# Patient Record
Sex: MALE | Race: ASIAN | NOT HISPANIC OR LATINO | URBAN - METROPOLITAN AREA
[De-identification: names, ages, dates, MRNs, and addresses within clinical notes are randomized per-mention and may not be internally consistent; named-entity substitution may affect disease eponyms.]

---

## 2020-07-09 ENCOUNTER — INPATIENT (INPATIENT)
Facility: HOSPITAL | Age: 80
LOS: 4 days | Discharge: HOME | DRG: 378 | End: 2020-07-14
Attending: FAMILY MEDICINE | Admitting: INTERNAL MEDICINE
Payer: MEDICARE

## 2020-07-09 VITALS
OXYGEN SATURATION: 100 % | RESPIRATION RATE: 18 BRPM | DIASTOLIC BLOOD PRESSURE: 63 MMHG | TEMPERATURE: 99 F | SYSTOLIC BLOOD PRESSURE: 112 MMHG | HEART RATE: 109 BPM

## 2020-07-09 DIAGNOSIS — D12.3 BENIGN NEOPLASM OF TRANSVERSE COLON: ICD-10-CM

## 2020-07-09 DIAGNOSIS — K57.30 DIVERTICULOSIS OF LARGE INTESTINE WITHOUT PERFORATION OR ABSCESS WITHOUT BLEEDING: ICD-10-CM

## 2020-07-09 DIAGNOSIS — D62 ACUTE POSTHEMORRHAGIC ANEMIA: ICD-10-CM

## 2020-07-09 DIAGNOSIS — K92.2 GASTROINTESTINAL HEMORRHAGE, UNSPECIFIED: ICD-10-CM

## 2020-07-09 DIAGNOSIS — Z86.010 PERSONAL HISTORY OF COLONIC POLYPS: ICD-10-CM

## 2020-07-09 DIAGNOSIS — Z11.59 ENCOUNTER FOR SCREENING FOR OTHER VIRAL DISEASES: ICD-10-CM

## 2020-07-09 DIAGNOSIS — Z98.890 OTHER SPECIFIED POSTPROCEDURAL STATES: Chronic | ICD-10-CM

## 2020-07-09 DIAGNOSIS — K76.0 FATTY (CHANGE OF) LIVER, NOT ELSEWHERE CLASSIFIED: ICD-10-CM

## 2020-07-09 DIAGNOSIS — K31.819 ANGIODYSPLASIA OF STOMACH AND DUODENUM WITHOUT BLEEDING: ICD-10-CM

## 2020-07-09 DIAGNOSIS — D12.5 BENIGN NEOPLASM OF SIGMOID COLON: ICD-10-CM

## 2020-07-09 DIAGNOSIS — E11.9 TYPE 2 DIABETES MELLITUS WITHOUT COMPLICATIONS: ICD-10-CM

## 2020-07-09 DIAGNOSIS — Z79.84 LONG TERM (CURRENT) USE OF ORAL HYPOGLYCEMIC DRUGS: ICD-10-CM

## 2020-07-09 DIAGNOSIS — E78.5 HYPERLIPIDEMIA, UNSPECIFIED: ICD-10-CM

## 2020-07-09 DIAGNOSIS — K29.70 GASTRITIS, UNSPECIFIED, WITHOUT BLEEDING: ICD-10-CM

## 2020-07-09 DIAGNOSIS — K44.9 DIAPHRAGMATIC HERNIA WITHOUT OBSTRUCTION OR GANGRENE: ICD-10-CM

## 2020-07-09 DIAGNOSIS — K25.4 CHRONIC OR UNSPECIFIED GASTRIC ULCER WITH HEMORRHAGE: ICD-10-CM

## 2020-07-09 DIAGNOSIS — K64.8 OTHER HEMORRHOIDS: ICD-10-CM

## 2020-07-09 LAB
ADD ON TEST-SPECIMEN IN LAB: SIGNIFICANT CHANGE UP
ALBUMIN SERPL ELPH-MCNC: 3.1 G/DL — LOW (ref 3.3–5)
ALP SERPL-CCNC: 37 U/L — LOW (ref 40–120)
ALT FLD-CCNC: 20 U/L — SIGNIFICANT CHANGE UP (ref 12–78)
ANION GAP SERPL CALC-SCNC: 6 MMOL/L — SIGNIFICANT CHANGE UP (ref 5–17)
APPEARANCE UR: CLEAR — SIGNIFICANT CHANGE UP
AST SERPL-CCNC: 8 U/L — LOW (ref 15–37)
BASOPHILS # BLD AUTO: 0.01 K/UL — SIGNIFICANT CHANGE UP (ref 0–0.2)
BASOPHILS NFR BLD AUTO: 0.1 % — SIGNIFICANT CHANGE UP (ref 0–2)
BILIRUB SERPL-MCNC: 0.2 MG/DL — SIGNIFICANT CHANGE UP (ref 0.2–1.2)
BILIRUB UR-MCNC: NEGATIVE — SIGNIFICANT CHANGE UP
BUN SERPL-MCNC: 45 MG/DL — HIGH (ref 7–23)
CALCIUM SERPL-MCNC: 8.3 MG/DL — LOW (ref 8.5–10.1)
CHLORIDE SERPL-SCNC: 108 MMOL/L — SIGNIFICANT CHANGE UP (ref 96–108)
CO2 SERPL-SCNC: 25 MMOL/L — SIGNIFICANT CHANGE UP (ref 22–31)
COLOR SPEC: YELLOW — SIGNIFICANT CHANGE UP
CREAT SERPL-MCNC: 0.8 MG/DL — SIGNIFICANT CHANGE UP (ref 0.5–1.3)
DIFF PNL FLD: ABNORMAL
EOSINOPHIL # BLD AUTO: 0.02 K/UL — SIGNIFICANT CHANGE UP (ref 0–0.5)
EOSINOPHIL NFR BLD AUTO: 0.2 % — SIGNIFICANT CHANGE UP (ref 0–6)
GLUCOSE SERPL-MCNC: 166 MG/DL — HIGH (ref 70–99)
GLUCOSE UR QL: 50 MG/DL
HCT VFR BLD CALC: 17.6 % — CRITICAL LOW (ref 39–50)
HGB BLD-MCNC: 6.1 G/DL — CRITICAL LOW (ref 13–17)
IMM GRANULOCYTES NFR BLD AUTO: 0.5 % — SIGNIFICANT CHANGE UP (ref 0–1.5)
KETONES UR-MCNC: ABNORMAL
LACTATE SERPL-SCNC: 1.9 MMOL/L — SIGNIFICANT CHANGE UP (ref 0.7–2)
LEUKOCYTE ESTERASE UR-ACNC: NEGATIVE — SIGNIFICANT CHANGE UP
LIDOCAIN IGE QN: 279 U/L — SIGNIFICANT CHANGE UP (ref 73–393)
LYMPHOCYTES # BLD AUTO: 2.09 K/UL — SIGNIFICANT CHANGE UP (ref 1–3.3)
LYMPHOCYTES # BLD AUTO: 24.1 % — SIGNIFICANT CHANGE UP (ref 13–44)
MAGNESIUM SERPL-MCNC: 1.9 MG/DL — SIGNIFICANT CHANGE UP (ref 1.6–2.6)
MCHC RBC-ENTMCNC: 33.9 PG — SIGNIFICANT CHANGE UP (ref 27–34)
MCHC RBC-ENTMCNC: 34.7 GM/DL — SIGNIFICANT CHANGE UP (ref 32–36)
MCV RBC AUTO: 97.8 FL — SIGNIFICANT CHANGE UP (ref 80–100)
MONOCYTES # BLD AUTO: 0.74 K/UL — SIGNIFICANT CHANGE UP (ref 0–0.9)
MONOCYTES NFR BLD AUTO: 8.5 % — SIGNIFICANT CHANGE UP (ref 2–14)
NEUTROPHILS # BLD AUTO: 5.76 K/UL — SIGNIFICANT CHANGE UP (ref 1.8–7.4)
NEUTROPHILS NFR BLD AUTO: 66.6 % — SIGNIFICANT CHANGE UP (ref 43–77)
NITRITE UR-MCNC: NEGATIVE — SIGNIFICANT CHANGE UP
PH UR: 5 — SIGNIFICANT CHANGE UP (ref 5–8)
PLATELET # BLD AUTO: 203 K/UL — SIGNIFICANT CHANGE UP (ref 150–400)
POTASSIUM SERPL-MCNC: 3.9 MMOL/L — SIGNIFICANT CHANGE UP (ref 3.5–5.3)
POTASSIUM SERPL-SCNC: 3.9 MMOL/L — SIGNIFICANT CHANGE UP (ref 3.5–5.3)
PROT SERPL-MCNC: 6.2 GM/DL — SIGNIFICANT CHANGE UP (ref 6–8.3)
PROT UR-MCNC: NEGATIVE MG/DL — SIGNIFICANT CHANGE UP
RBC # BLD: 1.8 M/UL — LOW (ref 4.2–5.8)
RBC # FLD: 13.2 % — SIGNIFICANT CHANGE UP (ref 10.3–14.5)
SARS-COV-2 RNA SPEC QL NAA+PROBE: SIGNIFICANT CHANGE UP
SODIUM SERPL-SCNC: 139 MMOL/L — SIGNIFICANT CHANGE UP (ref 135–145)
SP GR SPEC: 1 — LOW (ref 1.01–1.02)
TROPONIN I SERPL-MCNC: 0.02 NG/ML — SIGNIFICANT CHANGE UP (ref 0.01–0.04)
TROPONIN I SERPL-MCNC: <0.015 NG/ML — SIGNIFICANT CHANGE UP (ref 0.01–0.04)
TROPONIN I SERPL-MCNC: <0.015 NG/ML — SIGNIFICANT CHANGE UP (ref 0.01–0.04)
TSH SERPL-MCNC: 0.44 UU/ML — SIGNIFICANT CHANGE UP (ref 0.34–4.82)
UROBILINOGEN FLD QL: NEGATIVE MG/DL — SIGNIFICANT CHANGE UP
WBC # BLD: 8.66 K/UL — SIGNIFICANT CHANGE UP (ref 3.8–10.5)
WBC # FLD AUTO: 8.66 K/UL — SIGNIFICANT CHANGE UP (ref 3.8–10.5)

## 2020-07-09 PROCEDURE — 82962 GLUCOSE BLOOD TEST: CPT

## 2020-07-09 PROCEDURE — 80048 BASIC METABOLIC PNL TOTAL CA: CPT

## 2020-07-09 PROCEDURE — 84484 ASSAY OF TROPONIN QUANT: CPT

## 2020-07-09 PROCEDURE — C9113: CPT

## 2020-07-09 PROCEDURE — P9016: CPT

## 2020-07-09 PROCEDURE — 74177 CT ABD & PELVIS W/CONTRAST: CPT | Mod: 26

## 2020-07-09 PROCEDURE — 85014 HEMATOCRIT: CPT

## 2020-07-09 PROCEDURE — 36415 COLL VENOUS BLD VENIPUNCTURE: CPT

## 2020-07-09 PROCEDURE — 86920 COMPATIBILITY TEST SPIN: CPT

## 2020-07-09 PROCEDURE — 88305 TISSUE EXAM BY PATHOLOGIST: CPT

## 2020-07-09 PROCEDURE — 85027 COMPLETE CBC AUTOMATED: CPT

## 2020-07-09 PROCEDURE — 71045 X-RAY EXAM CHEST 1 VIEW: CPT | Mod: 26

## 2020-07-09 PROCEDURE — 83735 ASSAY OF MAGNESIUM: CPT

## 2020-07-09 PROCEDURE — 84100 ASSAY OF PHOSPHORUS: CPT

## 2020-07-09 PROCEDURE — 88312 SPECIAL STAINS GROUP 1: CPT

## 2020-07-09 PROCEDURE — 83036 HEMOGLOBIN GLYCOSYLATED A1C: CPT

## 2020-07-09 PROCEDURE — 99223 1ST HOSP IP/OBS HIGH 75: CPT | Mod: AI

## 2020-07-09 PROCEDURE — U0003: CPT

## 2020-07-09 PROCEDURE — 93010 ELECTROCARDIOGRAM REPORT: CPT

## 2020-07-09 PROCEDURE — 81001 URINALYSIS AUTO W/SCOPE: CPT

## 2020-07-09 PROCEDURE — 70450 CT HEAD/BRAIN W/O DYE: CPT | Mod: 26

## 2020-07-09 PROCEDURE — 85018 HEMOGLOBIN: CPT

## 2020-07-09 PROCEDURE — 87086 URINE CULTURE/COLONY COUNT: CPT

## 2020-07-09 PROCEDURE — 36430 TRANSFUSION BLD/BLD COMPNT: CPT

## 2020-07-09 PROCEDURE — C1889: CPT

## 2020-07-09 RX ORDER — PANTOPRAZOLE SODIUM 20 MG/1
8 TABLET, DELAYED RELEASE ORAL
Qty: 80 | Refills: 0 | Status: DISCONTINUED | OUTPATIENT
Start: 2020-07-09 | End: 2020-07-12

## 2020-07-09 RX ORDER — PANTOPRAZOLE SODIUM 20 MG/1
80 TABLET, DELAYED RELEASE ORAL ONCE
Refills: 0 | Status: COMPLETED | OUTPATIENT
Start: 2020-07-09 | End: 2020-07-09

## 2020-07-09 RX ORDER — ONDANSETRON 8 MG/1
4 TABLET, FILM COATED ORAL EVERY 6 HOURS
Refills: 0 | Status: DISCONTINUED | OUTPATIENT
Start: 2020-07-09 | End: 2020-07-14

## 2020-07-09 RX ORDER — DEXTROSE 50 % IN WATER 50 %
25 SYRINGE (ML) INTRAVENOUS ONCE
Refills: 0 | Status: DISCONTINUED | OUTPATIENT
Start: 2020-07-09 | End: 2020-07-14

## 2020-07-09 RX ORDER — ONDANSETRON 8 MG/1
4 TABLET, FILM COATED ORAL ONCE
Refills: 0 | Status: COMPLETED | OUTPATIENT
Start: 2020-07-09 | End: 2020-07-09

## 2020-07-09 RX ORDER — ATORVASTATIN CALCIUM 80 MG/1
20 TABLET, FILM COATED ORAL AT BEDTIME
Refills: 0 | Status: DISCONTINUED | OUTPATIENT
Start: 2020-07-09 | End: 2020-07-14

## 2020-07-09 RX ORDER — GLUCAGON INJECTION, SOLUTION 0.5 MG/.1ML
1 INJECTION, SOLUTION SUBCUTANEOUS ONCE
Refills: 0 | Status: DISCONTINUED | OUTPATIENT
Start: 2020-07-09 | End: 2020-07-14

## 2020-07-09 RX ORDER — DEXTROSE 50 % IN WATER 50 %
15 SYRINGE (ML) INTRAVENOUS ONCE
Refills: 0 | Status: DISCONTINUED | OUTPATIENT
Start: 2020-07-09 | End: 2020-07-14

## 2020-07-09 RX ORDER — MECLIZINE HCL 12.5 MG
25 TABLET ORAL ONCE
Refills: 0 | Status: COMPLETED | OUTPATIENT
Start: 2020-07-09 | End: 2020-07-09

## 2020-07-09 RX ORDER — SODIUM CHLORIDE 9 MG/ML
1000 INJECTION INTRAMUSCULAR; INTRAVENOUS; SUBCUTANEOUS ONCE
Refills: 0 | Status: COMPLETED | OUTPATIENT
Start: 2020-07-09 | End: 2020-07-09

## 2020-07-09 RX ORDER — DEXTROSE 50 % IN WATER 50 %
12.5 SYRINGE (ML) INTRAVENOUS ONCE
Refills: 0 | Status: DISCONTINUED | OUTPATIENT
Start: 2020-07-09 | End: 2020-07-14

## 2020-07-09 RX ORDER — INSULIN LISPRO 100/ML
VIAL (ML) SUBCUTANEOUS EVERY 6 HOURS
Refills: 0 | Status: DISCONTINUED | OUTPATIENT
Start: 2020-07-09 | End: 2020-07-11

## 2020-07-09 RX ORDER — ACETAMINOPHEN 500 MG
650 TABLET ORAL EVERY 4 HOURS
Refills: 0 | Status: DISCONTINUED | OUTPATIENT
Start: 2020-07-09 | End: 2020-07-14

## 2020-07-09 RX ORDER — SODIUM CHLORIDE 9 MG/ML
1000 INJECTION, SOLUTION INTRAVENOUS
Refills: 0 | Status: DISCONTINUED | OUTPATIENT
Start: 2020-07-09 | End: 2020-07-14

## 2020-07-09 RX ORDER — METFORMIN HYDROCHLORIDE 850 MG/1
1 TABLET ORAL
Qty: 0 | Refills: 0 | DISCHARGE

## 2020-07-09 RX ORDER — ROSUVASTATIN CALCIUM 5 MG/1
1 TABLET ORAL
Qty: 0 | Refills: 0 | DISCHARGE

## 2020-07-09 RX ADMIN — SODIUM CHLORIDE 1000 MILLILITER(S): 9 INJECTION INTRAMUSCULAR; INTRAVENOUS; SUBCUTANEOUS at 11:07

## 2020-07-09 RX ADMIN — ONDANSETRON 4 MILLIGRAM(S): 8 TABLET, FILM COATED ORAL at 11:08

## 2020-07-09 RX ADMIN — PANTOPRAZOLE SODIUM 10 MG/HR: 20 TABLET, DELAYED RELEASE ORAL at 21:35

## 2020-07-09 RX ADMIN — SODIUM CHLORIDE 1000 MILLILITER(S): 9 INJECTION INTRAMUSCULAR; INTRAVENOUS; SUBCUTANEOUS at 13:08

## 2020-07-09 RX ADMIN — ATORVASTATIN CALCIUM 20 MILLIGRAM(S): 80 TABLET, FILM COATED ORAL at 22:14

## 2020-07-09 RX ADMIN — PANTOPRAZOLE SODIUM 10 MG/HR: 20 TABLET, DELAYED RELEASE ORAL at 13:13

## 2020-07-09 RX ADMIN — Medication 25 MILLIGRAM(S): at 12:29

## 2020-07-09 RX ADMIN — PANTOPRAZOLE SODIUM 80 MILLIGRAM(S): 20 TABLET, DELAYED RELEASE ORAL at 12:29

## 2020-07-09 NOTE — H&P ADULT - HISTORY OF PRESENT ILLNESS
78yo/M with PMH diabetes, hyperlipidemia presented with weakness and melena. Patient states he had one episode of melena on July 4th, then he felt better until earlier today when he had another episode of melena. +abd discomfort, +vomiting. Denies taking NSAID's. Last EGD/colonoscopy was about 10-12 years ago with DR Bob. Not on any AC. IN ED started on Protonix drip and received already 1 Unit PRBC. Due for 2nd unit. Feels comfortable now

## 2020-07-09 NOTE — ED PROVIDER NOTE - CLINICAL SUMMARY MEDICAL DECISION MAKING FREE TEXT BOX
80 y/o male presents with vomiting, dizziness. EKG, CXR, CT head, abd, labs, urine. 80 y/o male presents with vomiting, dizziness. EKG, CXR, CT head, abd/pelvis, labs, urine.

## 2020-07-09 NOTE — H&P ADULT - ASSESSMENT
#Likely UGI bleed  #Anemia acute blood loss  Admit to med-surg  S/p 2 Units PRBc transfusion  Started on Protonix drip in ED  GI eval Dr Bob  D/w GI- likely for EGD in AM  Keep NPO for now  Monitor HH post-transfusion    #Diabetes  ISS q6h    #COVID swabbed, pending. Low suspicion    #DVT proph- venodynes    #Dispo- inpatient admit

## 2020-07-09 NOTE — ED ADULT TRIAGE NOTE - CHIEF COMPLAINT QUOTE
pt c/o vomiting since saturday with dizziness and weakness. pt dneies pain diarrhea and fever Daughter Kerline

## 2020-07-09 NOTE — H&P ADULT - NSHPLABSRESULTS_GEN_ALL_CORE
6.1    8.66  )-----------( 203      ( 2020 11:02 )             17.6     2020 11:02    139    |  108    |  45     ----------------------------<  166    3.9     |  25     |  0.80     Ca    8.3        2020 11:02  Mg     1.9       2020 11:02    TPro  6.2    /  Alb  3.1    /  TBili  0.2    /  DBili  x      /  AST  8      /  ALT  20     /  AlkPhos  37     2020 11:02    LIVER FUNCTIONS - ( 2020 11:02 )  Alb: 3.1 g/dL / Pro: 6.2 gm/dL / ALK PHOS: 37 U/L / ALT: 20 U/L / AST: 8 U/L / GGT: x           PT/INR - ( 2020 11:02 )   PT: 12.6 sec;   INR: 1.08 ratio      PTT - ( 2020 11:02 )  PTT:27.2 sec    POCT Blood Glucose.: 140 mg/dL (2020 17:37)    CARDIAC MARKERS ( 2020 14:38 )  <0.015 ng/mL / x     / x     / x     / x      CARDIAC MARKERS ( 2020 11:02 )  <0.015 ng/mL / x     / x     / x     / x        Urinalysis Basic - ( 2020 14:30 )  Color: Yellow / Appearance: Clear / S.005 / pH: x  Gluc: x / Ketone: Moderate  / Bili: Negative / Urobili: Negative mg/dL   Blood: x / Protein: Negative mg/dL / Nitrite: Negative   Leuk Esterase: Negative / RBC: 0-2 /HPF / WBC Negative   Sq Epi: x / Non Sq Epi: Occasional / Bacteria: x

## 2020-07-09 NOTE — ED PROVIDER NOTE - OBJECTIVE STATEMENT
78 y/o male with PMHx of DM, HLD presents to the ED c/o vomiting onset 5 days ago. Following morning, x3 episodes, dizziness upon sitting up. Yesterday x1 episode of emesis after eating. Describes pain as pressure on abd. +bowel movement this morning. Denies fever, dysuria, chest pain. Endorses feeling dizzy and double vision upon movement. Currently taking DM medication. No other complaints at this time.

## 2020-07-09 NOTE — ED PROVIDER NOTE - PROGRESS NOTE DETAILS
Kanu ANN for ED attending, Dr. Deng: Pt now endorsing that he has had black stool for multiple days.  Rectal exam performed. Guiac +, black stool. LOT: 175. Expiration: 2/28/2021 Kanu ANN for ED attending, Dr. Deng: Pt now endorsing that he has had black stool for multiple days.  Rectal exam performed. Guaiac +, black stool. LOT: 175. Expiration: 2/28/2021 Sowmya CAMPOS: patient endorsed to dr. means for admission for upper gib

## 2020-07-10 LAB
A1C WITH ESTIMATED AVERAGE GLUCOSE RESULT: 6 % — HIGH (ref 4–5.6)
ANION GAP SERPL CALC-SCNC: 7 MMOL/L — SIGNIFICANT CHANGE UP (ref 5–17)
BUN SERPL-MCNC: 24 MG/DL — HIGH (ref 7–23)
CALCIUM SERPL-MCNC: 7.8 MG/DL — LOW (ref 8.5–10.1)
CHLORIDE SERPL-SCNC: 111 MMOL/L — HIGH (ref 96–108)
CO2 SERPL-SCNC: 24 MMOL/L — SIGNIFICANT CHANGE UP (ref 22–31)
CREAT SERPL-MCNC: 0.61 MG/DL — SIGNIFICANT CHANGE UP (ref 0.5–1.3)
CULTURE RESULTS: SIGNIFICANT CHANGE UP
ESTIMATED AVERAGE GLUCOSE: 126 MG/DL — HIGH (ref 68–114)
GLUCOSE SERPL-MCNC: 78 MG/DL — SIGNIFICANT CHANGE UP (ref 70–99)
HCT VFR BLD CALC: 23 % — LOW (ref 39–50)
HGB BLD-MCNC: 7.7 G/DL — LOW (ref 13–17)
MCHC RBC-ENTMCNC: 31.7 PG — SIGNIFICANT CHANGE UP (ref 27–34)
MCHC RBC-ENTMCNC: 33.5 GM/DL — SIGNIFICANT CHANGE UP (ref 32–36)
MCV RBC AUTO: 94.7 FL — SIGNIFICANT CHANGE UP (ref 80–100)
PLATELET # BLD AUTO: 141 K/UL — LOW (ref 150–400)
POTASSIUM SERPL-MCNC: 4 MMOL/L — SIGNIFICANT CHANGE UP (ref 3.5–5.3)
POTASSIUM SERPL-SCNC: 4 MMOL/L — SIGNIFICANT CHANGE UP (ref 3.5–5.3)
RBC # BLD: 2.43 M/UL — LOW (ref 4.2–5.8)
RBC # FLD: 14.7 % — HIGH (ref 10.3–14.5)
SARS-COV-2 IGG SERPL QL IA: NEGATIVE — SIGNIFICANT CHANGE UP
SARS-COV-2 IGM SERPL IA-ACNC: <0.1 INDEX — SIGNIFICANT CHANGE UP
SODIUM SERPL-SCNC: 142 MMOL/L — SIGNIFICANT CHANGE UP (ref 135–145)
SPECIMEN SOURCE: SIGNIFICANT CHANGE UP
WBC # BLD: 5.18 K/UL — SIGNIFICANT CHANGE UP (ref 3.8–10.5)
WBC # FLD AUTO: 5.18 K/UL — SIGNIFICANT CHANGE UP (ref 3.8–10.5)

## 2020-07-10 PROCEDURE — 99233 SBSQ HOSP IP/OBS HIGH 50: CPT

## 2020-07-10 PROCEDURE — 88312 SPECIAL STAINS GROUP 1: CPT | Mod: 26

## 2020-07-10 PROCEDURE — 88305 TISSUE EXAM BY PATHOLOGIST: CPT | Mod: 26

## 2020-07-10 RX ORDER — SODIUM CHLORIDE 9 MG/ML
1000 INJECTION INTRAMUSCULAR; INTRAVENOUS; SUBCUTANEOUS
Refills: 0 | Status: DISCONTINUED | OUTPATIENT
Start: 2020-07-10 | End: 2020-07-11

## 2020-07-10 RX ADMIN — PANTOPRAZOLE SODIUM 10 MG/HR: 20 TABLET, DELAYED RELEASE ORAL at 08:20

## 2020-07-10 RX ADMIN — SODIUM CHLORIDE 100 MILLILITER(S): 9 INJECTION INTRAMUSCULAR; INTRAVENOUS; SUBCUTANEOUS at 01:00

## 2020-07-10 RX ADMIN — ATORVASTATIN CALCIUM 20 MILLIGRAM(S): 80 TABLET, FILM COATED ORAL at 21:22

## 2020-07-10 NOTE — CONSULT NOTE ADULT - SUBJECTIVE AND OBJECTIVE BOX
HPI:  80yo/M with PMH diabetes, hyperlipidemia presented with weakness and melena.  Noted vomiting dark blood and nausea intermittently since July 3, 2020  and then followed by melanotic stools in past several days   denies abd pain   reduced appetite  no fever or chills  no wt loss  bm's regular   last colonoscopy according to pt was 12 yrs ago.  no diarrhea  no jaundice  recent aspirin use for headache/cold   received three units of blood     PAST MEDICAL & SURGICAL HISTORY:  DM (diabetes mellitus)  S/P colonoscopic polypectomy      Allergies    No Known Allergies    Intolerances        MEDICATIONS  (STANDING):  atorvastatin 20 milliGRAM(s) Oral at bedtime  dextrose 5%. 1000 milliLiter(s) (50 mL/Hr) IV Continuous <Continuous>  dextrose 50% Injectable 12.5 Gram(s) IV Push once  dextrose 50% Injectable 25 Gram(s) IV Push once  dextrose 50% Injectable 25 Gram(s) IV Push once  insulin lispro (HumaLOG) corrective regimen sliding scale   SubCutaneous every 6 hours  multivitamin 1 Tablet(s) Oral daily  pantoprazole Infusion 8 mG/Hr (10 mL/Hr) IV Continuous <Continuous>  sodium chloride 0.9%. 1000 milliLiter(s) (100 mL/Hr) IV Continuous <Continuous>    MEDICATIONS  (PRN):  acetaminophen   Tablet .. 650 milliGRAM(s) Oral every 4 hours PRN Mild Pain (1 - 3)  aluminum hydroxide/magnesium hydroxide/simethicone Suspension 30 milliLiter(s) Oral every 4 hours PRN Dyspepsia  dextrose 40% Gel 15 Gram(s) Oral once PRN Blood Glucose LESS THAN 70 milliGRAM(s)/deciliter  glucagon  Injectable 1 milliGRAM(s) IntraMuscular once PRN Glucose LESS THAN 70 milligrams/deciliter  ondansetron Injectable 4 milliGRAM(s) IV Push every 6 hours PRN Nausea      FAMILY HISTORY:  FH: diabetes mellitus      Social History:  no tobacco and no etoh   REVIEW OF SYSTEMS      General:	No fever or chills    Skin/Breast: No jaundice or rash   		  ENMT: denies sore throat or thrush    Respiratory and Thorax: Denies dyspnea or cough or shortness of breath  	  Cardiovascular: Denies chest pain or palpitations 	    Gastrointestinal: Denies jaundice or pruritis    Genitourinary: Denies dysuria or hematuria	    Musculoskeletal: Denies muscular pain or swelling	    Neurological: Denies confusion or tremor	    Hematology/Lymphatics: Denies easy bruising 	    Endocrine:	Denies polyphagia or polyuria    See above hx otherwise neg for any major organ systems    PHYSICAL EXAM:    Vital Signs Last 24 Hrs  T(C): 36.3 (10 Jul 2020 13:53), Max: 37 (09 Jul 2020 17:50)  T(F): 97.3 (10 Jul 2020 13:53), Max: 98.6 (09 Jul 2020 17:50)  HR: 83 (10 Jul 2020 13:53) (74 - 88)  BP: 124/64 (10 Jul 2020 13:53) (102/49 - 137/67)  BP(mean): 67 (09 Jul 2020 17:50) (61 - 67)  RR: 18 (10 Jul 2020 13:53) (16 - 20)  SpO2: 100% (10 Jul 2020 13:53) (97% - 100%)    Constitutional: no acute distress    ENMT: NC/AT scl anicteric opm pink no lesions     Neck: supple. No jvd or LN    Respiratory: Clear     Cardiovascular: RRR s1s2     Gastrointestinal: Pos bs , soft , not tender, no hepatosplenomegaly,  no mass    Back: No CVA tenderness    Extremities: NO cce     Neurological: Alert and oriented x 3     Skin: No rash or jaundice     Date/Time:07-10 @ 10:17    Albumin: --  ALT/SGPT: --  Alk Phos: --  AST/SGOT: --  Bilirubin Direct: --  Bilirubin Total: --  Ca: 7.8  eGFR : 109  eGFR Non-: 94  Lipase: --  Amylase: --  INR: --  PTT: --      07-10    142  |  111<H>  |  24<H>  ----------------------------<  78  4.0   |  24  |  0.61    Ca    7.8<L>      10 Jul 2020 10:17  Mg     1.9     07-09    TPro  6.2  /  Alb  3.1<L>  /  TBili  0.2  /  DBili  x   /  AST  8<L>  /  ALT  20  /  AlkPhos  37<L>  07-09                            7.7    5.18  )-----------( 141      ( 10 Jul 2020 10:17 )             23.0

## 2020-07-10 NOTE — CONSULT NOTE ADULT - ASSESSMENT
hematemesis    melena  ugi bleed    advised upper endoscopy with possible biopsies with possible bicap cautery with iv sedation  rationale, all potential risks including but not limited to perforation requiring surgery, aspiration pneumonia or bleeding requiring blood transfusions were discussed along with all benefits and alternatives and the pt wishes to have an upper endoscopy with iv sedation.  protonix drip  maintain hgb above 8  dnr rescinded for procedure time/ pt consented to this.   npo except po meds after mn

## 2020-07-11 LAB
ANION GAP SERPL CALC-SCNC: 9 MMOL/L — SIGNIFICANT CHANGE UP (ref 5–17)
BUN SERPL-MCNC: 17 MG/DL — SIGNIFICANT CHANGE UP (ref 7–23)
CALCIUM SERPL-MCNC: 7.7 MG/DL — LOW (ref 8.5–10.1)
CHLORIDE SERPL-SCNC: 109 MMOL/L — HIGH (ref 96–108)
CO2 SERPL-SCNC: 22 MMOL/L — SIGNIFICANT CHANGE UP (ref 22–31)
CREAT SERPL-MCNC: 0.72 MG/DL — SIGNIFICANT CHANGE UP (ref 0.5–1.3)
GLUCOSE SERPL-MCNC: 130 MG/DL — HIGH (ref 70–99)
HCT VFR BLD CALC: 30.5 % — LOW (ref 39–50)
HGB BLD-MCNC: 10.4 G/DL — LOW (ref 13–17)
MAGNESIUM SERPL-MCNC: 1.9 MG/DL — SIGNIFICANT CHANGE UP (ref 1.6–2.6)
MCHC RBC-ENTMCNC: 30.7 PG — SIGNIFICANT CHANGE UP (ref 27–34)
MCHC RBC-ENTMCNC: 34.1 GM/DL — SIGNIFICANT CHANGE UP (ref 32–36)
MCV RBC AUTO: 90 FL — SIGNIFICANT CHANGE UP (ref 80–100)
PHOSPHATE SERPL-MCNC: 3.5 MG/DL — SIGNIFICANT CHANGE UP (ref 2.5–4.5)
PLATELET # BLD AUTO: 170 K/UL — SIGNIFICANT CHANGE UP (ref 150–400)
POTASSIUM SERPL-MCNC: 4.2 MMOL/L — SIGNIFICANT CHANGE UP (ref 3.5–5.3)
POTASSIUM SERPL-SCNC: 4.2 MMOL/L — SIGNIFICANT CHANGE UP (ref 3.5–5.3)
RBC # BLD: 3.39 M/UL — LOW (ref 4.2–5.8)
RBC # FLD: 16.2 % — HIGH (ref 10.3–14.5)
SODIUM SERPL-SCNC: 140 MMOL/L — SIGNIFICANT CHANGE UP (ref 135–145)
WBC # BLD: 5.11 K/UL — SIGNIFICANT CHANGE UP (ref 3.8–10.5)
WBC # FLD AUTO: 5.11 K/UL — SIGNIFICANT CHANGE UP (ref 3.8–10.5)

## 2020-07-11 PROCEDURE — 99232 SBSQ HOSP IP/OBS MODERATE 35: CPT

## 2020-07-11 RX ORDER — INSULIN LISPRO 100/ML
VIAL (ML) SUBCUTANEOUS
Refills: 0 | Status: DISCONTINUED | OUTPATIENT
Start: 2020-07-11 | End: 2020-07-14

## 2020-07-11 RX ADMIN — PANTOPRAZOLE SODIUM 10 MG/HR: 20 TABLET, DELAYED RELEASE ORAL at 21:46

## 2020-07-11 RX ADMIN — Medication 1 TABLET(S): at 10:31

## 2020-07-11 RX ADMIN — ATORVASTATIN CALCIUM 20 MILLIGRAM(S): 80 TABLET, FILM COATED ORAL at 21:45

## 2020-07-11 RX ADMIN — Medication 4: at 17:20

## 2020-07-11 RX ADMIN — Medication 5: at 12:26

## 2020-07-11 RX ADMIN — Medication 2: at 00:04

## 2020-07-12 LAB
HCT VFR BLD CALC: 34.8 % — LOW (ref 39–50)
HGB BLD-MCNC: 11.8 G/DL — LOW (ref 13–17)
SARS-COV-2 RNA SPEC QL NAA+PROBE: SIGNIFICANT CHANGE UP

## 2020-07-12 PROCEDURE — 99232 SBSQ HOSP IP/OBS MODERATE 35: CPT

## 2020-07-12 RX ORDER — SOD SULF/SODIUM/NAHCO3/KCL/PEG
4000 SOLUTION, RECONSTITUTED, ORAL ORAL ONCE
Refills: 0 | Status: COMPLETED | OUTPATIENT
Start: 2020-07-12 | End: 2020-07-12

## 2020-07-12 RX ORDER — SODIUM CHLORIDE 9 MG/ML
1000 INJECTION INTRAMUSCULAR; INTRAVENOUS; SUBCUTANEOUS
Refills: 0 | Status: DISCONTINUED | OUTPATIENT
Start: 2020-07-13 | End: 2020-07-14

## 2020-07-12 RX ORDER — PANTOPRAZOLE SODIUM 20 MG/1
40 TABLET, DELAYED RELEASE ORAL EVERY 12 HOURS
Refills: 0 | Status: DISCONTINUED | OUTPATIENT
Start: 2020-07-12 | End: 2020-07-14

## 2020-07-12 RX ADMIN — PANTOPRAZOLE SODIUM 40 MILLIGRAM(S): 20 TABLET, DELAYED RELEASE ORAL at 21:20

## 2020-07-12 RX ADMIN — Medication 4000 MILLILITER(S): at 17:44

## 2020-07-13 LAB
ANION GAP SERPL CALC-SCNC: 6 MMOL/L — SIGNIFICANT CHANGE UP (ref 5–17)
BUN SERPL-MCNC: 8 MG/DL — SIGNIFICANT CHANGE UP (ref 7–23)
CALCIUM SERPL-MCNC: 8.3 MG/DL — LOW (ref 8.5–10.1)
CHLORIDE SERPL-SCNC: 107 MMOL/L — SIGNIFICANT CHANGE UP (ref 96–108)
CO2 SERPL-SCNC: 28 MMOL/L — SIGNIFICANT CHANGE UP (ref 22–31)
CREAT SERPL-MCNC: 0.68 MG/DL — SIGNIFICANT CHANGE UP (ref 0.5–1.3)
GLUCOSE SERPL-MCNC: 146 MG/DL — HIGH (ref 70–99)
HCT VFR BLD CALC: 32.1 % — LOW (ref 39–50)
HGB BLD-MCNC: 10.9 G/DL — LOW (ref 13–17)
MAGNESIUM SERPL-MCNC: 1.8 MG/DL — SIGNIFICANT CHANGE UP (ref 1.6–2.6)
MCHC RBC-ENTMCNC: 31 PG — SIGNIFICANT CHANGE UP (ref 27–34)
MCHC RBC-ENTMCNC: 34 GM/DL — SIGNIFICANT CHANGE UP (ref 32–36)
MCV RBC AUTO: 91.2 FL — SIGNIFICANT CHANGE UP (ref 80–100)
PHOSPHATE SERPL-MCNC: 3.1 MG/DL — SIGNIFICANT CHANGE UP (ref 2.5–4.5)
PLATELET # BLD AUTO: 214 K/UL — SIGNIFICANT CHANGE UP (ref 150–400)
POTASSIUM SERPL-MCNC: 3.2 MMOL/L — LOW (ref 3.5–5.3)
POTASSIUM SERPL-SCNC: 3.2 MMOL/L — LOW (ref 3.5–5.3)
RBC # BLD: 3.52 M/UL — LOW (ref 4.2–5.8)
RBC # FLD: 16.2 % — HIGH (ref 10.3–14.5)
SODIUM SERPL-SCNC: 141 MMOL/L — SIGNIFICANT CHANGE UP (ref 135–145)
WBC # BLD: 4.75 K/UL — SIGNIFICANT CHANGE UP (ref 3.8–10.5)
WBC # FLD AUTO: 4.75 K/UL — SIGNIFICANT CHANGE UP (ref 3.8–10.5)

## 2020-07-13 PROCEDURE — 99232 SBSQ HOSP IP/OBS MODERATE 35: CPT

## 2020-07-13 RX ORDER — POTASSIUM CHLORIDE 20 MEQ
10 PACKET (EA) ORAL
Refills: 0 | Status: COMPLETED | OUTPATIENT
Start: 2020-07-13 | End: 2020-07-13

## 2020-07-13 RX ADMIN — PANTOPRAZOLE SODIUM 40 MILLIGRAM(S): 20 TABLET, DELAYED RELEASE ORAL at 21:13

## 2020-07-13 RX ADMIN — SODIUM CHLORIDE 75 MILLILITER(S): 9 INJECTION INTRAMUSCULAR; INTRAVENOUS; SUBCUTANEOUS at 00:02

## 2020-07-13 RX ADMIN — Medication 100 MILLIEQUIVALENT(S): at 17:59

## 2020-07-13 RX ADMIN — Medication 100 MILLIEQUIVALENT(S): at 19:43

## 2020-07-13 RX ADMIN — PANTOPRAZOLE SODIUM 40 MILLIGRAM(S): 20 TABLET, DELAYED RELEASE ORAL at 07:59

## 2020-07-13 RX ADMIN — Medication 100 MILLIEQUIVALENT(S): at 21:14

## 2020-07-14 VITALS
RESPIRATION RATE: 17 BRPM | HEART RATE: 90 BPM | SYSTOLIC BLOOD PRESSURE: 107 MMHG | TEMPERATURE: 98 F | DIASTOLIC BLOOD PRESSURE: 67 MMHG | OXYGEN SATURATION: 100 %

## 2020-07-14 LAB
ANION GAP SERPL CALC-SCNC: 4 MMOL/L — LOW (ref 5–17)
BUN SERPL-MCNC: 8 MG/DL — SIGNIFICANT CHANGE UP (ref 7–23)
CALCIUM SERPL-MCNC: 8.8 MG/DL — SIGNIFICANT CHANGE UP (ref 8.5–10.1)
CHLORIDE SERPL-SCNC: 108 MMOL/L — SIGNIFICANT CHANGE UP (ref 96–108)
CO2 SERPL-SCNC: 29 MMOL/L — SIGNIFICANT CHANGE UP (ref 22–31)
CREAT SERPL-MCNC: 0.69 MG/DL — SIGNIFICANT CHANGE UP (ref 0.5–1.3)
GLUCOSE SERPL-MCNC: 139 MG/DL — HIGH (ref 70–99)
HCT VFR BLD CALC: 33.9 % — LOW (ref 39–50)
HGB BLD-MCNC: 11.6 G/DL — LOW (ref 13–17)
MAGNESIUM SERPL-MCNC: 1.9 MG/DL — SIGNIFICANT CHANGE UP (ref 1.6–2.6)
MCHC RBC-ENTMCNC: 31.5 PG — SIGNIFICANT CHANGE UP (ref 27–34)
MCHC RBC-ENTMCNC: 34.2 GM/DL — SIGNIFICANT CHANGE UP (ref 32–36)
MCV RBC AUTO: 92.1 FL — SIGNIFICANT CHANGE UP (ref 80–100)
PHOSPHATE SERPL-MCNC: 3.2 MG/DL — SIGNIFICANT CHANGE UP (ref 2.5–4.5)
PLATELET # BLD AUTO: 223 K/UL — SIGNIFICANT CHANGE UP (ref 150–400)
POTASSIUM SERPL-MCNC: 4 MMOL/L — SIGNIFICANT CHANGE UP (ref 3.5–5.3)
POTASSIUM SERPL-SCNC: 4 MMOL/L — SIGNIFICANT CHANGE UP (ref 3.5–5.3)
RBC # BLD: 3.68 M/UL — LOW (ref 4.2–5.8)
RBC # FLD: 15.9 % — HIGH (ref 10.3–14.5)
SODIUM SERPL-SCNC: 141 MMOL/L — SIGNIFICANT CHANGE UP (ref 135–145)
WBC # BLD: 7.43 K/UL — SIGNIFICANT CHANGE UP (ref 3.8–10.5)
WBC # FLD AUTO: 7.43 K/UL — SIGNIFICANT CHANGE UP (ref 3.8–10.5)

## 2020-07-14 PROCEDURE — 99239 HOSP IP/OBS DSCHRG MGMT >30: CPT

## 2020-07-14 RX ORDER — PANTOPRAZOLE SODIUM 20 MG/1
1 TABLET, DELAYED RELEASE ORAL
Qty: 60 | Refills: 0
Start: 2020-07-14 | End: 2020-08-12

## 2020-07-14 RX ADMIN — SODIUM CHLORIDE 75 MILLILITER(S): 9 INJECTION INTRAMUSCULAR; INTRAVENOUS; SUBCUTANEOUS at 01:40

## 2020-07-14 RX ADMIN — PANTOPRAZOLE SODIUM 40 MILLIGRAM(S): 20 TABLET, DELAYED RELEASE ORAL at 09:09

## 2020-07-14 NOTE — DISCHARGE NOTE PROVIDER - NSDCCPCAREPLAN_GEN_ALL_CORE_FT
PRINCIPAL DISCHARGE DIAGNOSIS  Diagnosis: GIB (gastrointestinal bleeding)  Assessment and Plan of Treatment: #Hematemesis and Melena Likely UGI bleed  #Anemia acute blood loss  COVID neg  CT abd  -  Gastric wall thickening, question gastritis. Hepatic steatosis  S/p 4 Units PRBc transfusion;    Keep Hgb Above 8  S/P protonix Drip - Change to PPI Q12H  GI eval appreciated Dr Agudelo  S/P EGD 7/11 fundic ulcers ?submucosal prominence r/o gist vs. fundic ulcer  S/P colonoscopy 7/14 - S/p Poly pectomy  Patient will need an EUS in 6 weeks to be evaluated for Possible tumor versus ulcer, continue protonix BID And FU with Dr agudelo in 3 weeks  -NO ASA or blood thinners

## 2020-07-14 NOTE — DISCHARGE NOTE PROVIDER - CARE PROVIDER_API CALL
Osmani Bob  GASTROENTEROLOGY  [address] [place] [address]  Phone: [phone]  Fax: [phone]  Follow Up Time:     FU with PCP,   Phone: (   )    -  Fax: (   )    -  Follow Up Time:

## 2020-07-14 NOTE — DISCHARGE NOTE PROVIDER - HOSPITAL COURSE
HOSPITALIST PROGRESS NOTE:    SUBJECTIVE:    PCP:    Chief Complaint: Patient is a 79y old  Male who presents with a chief complaint of weakness. Vomiting. Melena (10 Jul 2020 16:09)            HPI:    78yo/M with PMH diabetes, hyperlipidemia presented with weakness and melena. Patient states he had one episode of melena on July 4th, then he felt better until earlier today when he had another episode of melena. +abd discomfort, +vomiting. Denies taking NSAID's. Last EGD/colonoscopy was about 10-12 years ago with DR Agudelo. Not on any AC. IN ED started on Protonix drip and received already 1 Unit PRBC. Due for 2nd unit. Feels comfortable now (09 Jul 2020 16:23)        7/10: Above reviewed; after 2 units of PRBC yesterday; Hgb 7.7; transfusing another 2 units prior to EGD today; Patient has no other complaints     7/11: S/P EGD yesterday now going for colonoscopy on Monday; H/H stable after total of 4 units PRBC. patient has not complaints    7/12:  Patient has no complaints; on clears and going for colonoscopy angelia; No evidence of bleeding     7/13:  Patient has no complaints; going for Colonoscopy later today; No signs of bleeding, cp, dyspnea or abdominal pain; H/h Stable     7/14:  Patient has no complaints; tolerating diet;        #Hematemesis and Melena Likely UGI bleed    #Anemia acute blood loss    COVID neg    CT abd  -  Gastric wall thickening, question gastritis. Hepatic steatosis    S/p 4 Units PRBc transfusion;      Keep Hgb Above 8    S/P protonix Drip - Change to PPI Q12H    GI eval appreciated Dr Agudelo    S/P EGD 7/11 fundic ulcers ?submucosal prominence r/o gist vs. fundic ulcer    S/P colonoscopy 7/14 - S/p Poly pectomy    Discussed with Dr Tena Patient will need an EUS in 6 weeks to evaluated Possible tumor versus ulcer, continue protonix BUD And FU with Dr agudelo in 3 weeks        #Diabetes    A1C 6.0    Hold Glipizide and Metformin    ISS q6h        #DVT proph- venodynes        #Dispo- D/C home         total time 80 minutes

## 2020-07-14 NOTE — DISCHARGE NOTE NURSING/CASE MANAGEMENT/SOCIAL WORK - PATIENT PORTAL LINK FT
You can access the FollowMyHealth Patient Portal offered by [facility] by registering at the following website: [email or web address]. By joining [employer]’s FollowMyHealth portal, you will also be able to view your health information using other applications (apps) compatible with our system.

## 2020-07-14 NOTE — PROGRESS NOTE ADULT - ASSESSMENT
#Hematemesis and Melena Likely UGI bleed  #Anemia acute blood loss  COVID neg  CT abd  -  Gastric wall thickening, question gastritis. Hepatic steatosis  S/p 4 Units PRBc transfusion;    Keep Hgb Above 8  S/P protonix Drip - Cange to PPI Q12H  GI eval appreciated Dr Bob  S/P EGD 7/11 fundic ulcers ?submucosal prominence r/o gist vs. fundic ulcer  IVF, Npo after MN for colonoscopy Monday    #Diabetes  A1C 6.0  Hold Glipizide and Metformin  ISS q6h    #DVT proph- venodynes    #Dispo- Awaiting colonoscopy
#Hematemesis and Melena Likely UGI bleed  #Anemia acute blood loss  COVID neg  CT abd  -  Gastric wall thickening, question gastritis. Hepatic steatosis  S/p 4 Units PRBc transfusion;    Keep Hgb Above 8  S/P protonix Drip - Change to PPI Q12H  GI eval appreciated Dr Bob  S/P EGD 7/11 fundic ulcers ?submucosal prominence r/o gist vs. fundic ulcer  IVF, Npo for colonoscopy today    #Diabetes  A1C 6.0  Hold Glipizide and Metformin  ISS q6h    #DVT proph- venodynes    #Dispo- Awaiting colonoscopy
#Hematemesis and Melena Likely UGI bleed  #Anemia acute blood loss  COVID neg  CT abd  -  Gastric wall thickening, question gastritis. Hepatic steatosis  S/p 4 Units PRBc transfusion;    Keep Hgb Above 8  continue protonix Drip   GI eval appreciated Dr Bob  S/P EGD 7/11 fundic ulcers ?submucosal prominence r/o gist vs. fundic ulcer  clears for colonoscopy Monday    #Diabetes  A1C 6.0  Hold Glipizide and Metformin  ISS q6h    #DVT proph- venodynes    #Dispo- Awaiting EGD
#Hematemesis and Melena Likely UGI bleed  #Anemia acute blood loss  COVID neg  CT abd  -  Gastric wall thickening, question gastritis. Hepatic steatosis  S/p 4 Units PRBc transfusion;    Keep Hgb Above 8  S/P protonix Drip - Change to PPI Q12H  GI eval appreciated Dr Agudelo  S/P EGD 7/11 fundic ulcers ?submucosal prominence r/o gist vs. fundic ulcer  S/P colonoscopy 7/14 - S/p Poly pectomy  Discussed with Dr Tena Patient will need an EUS in 6 weeks to evaluated Possible tumor versus ulcer, continue protonix BUD And FU with Dr agudelo in 3 weeks    #Diabetes  A1C 6.0  Hold Glipizide and Metformin  ISS q6h    #DVT proph- venodynes    #Dispo- D/C home
#Hematemesis and Melena Likely UGI bleed  #Anemia acute blood loss  COVID neg  CT abd  -  Gastric wall thickening, question gastritis.Hepatic steatosis  S/p 2 Units PRBc transfusion; transfuse another 2 units or PRBC today   Keep Hgb Above 8  continue protonix Drip   GI eval appreciated Dr Bob  D/w GI- likely for EGD today  Keep NPO for now  Monitor HH post-transfusion    #Diabetes  A1C 6.0  Hold Glipizide and Metformin  ISS q6h    #DVT proph- venodynes    #Dispo- Awaiting EGD
UGi bleed   stable    fundic ulcers ?submucosal prominence r/o gist vs. fundic ulcer    protonix  clears  for colonoscopy Monday  hgb stable.

## 2020-07-14 NOTE — PROGRESS NOTE ADULT - REASON FOR ADMISSION
weakness. Vomiting. Melena

## 2020-07-14 NOTE — PROGRESS NOTE ADULT - SUBJECTIVE AND OBJECTIVE BOX
HOSPITALIST PROGRESS NOTE:  SUBJECTIVE:  PCP:  Chief Complaint: Patient is a 79y old  Male who presents with a chief complaint of weakness. Vomiting. Melena (10 Jul 2020 16:09)      HPI:  80yo/M with PMH diabetes, hyperlipidemia presented with weakness and melena. Patient states he had one episode of melena on , then he felt better until earlier today when he had another episode of melena. +abd discomfort, +vomiting. Denies taking NSAID's. Last EGD/colonoscopy was about 10-12 years ago with DR Bob. Not on any AC. IN ED started on Protonix drip and received already 1 Unit PRBC. Due for 2nd unit. Feels comfortable now (2020 16:23)    7/10: Above reviewed; after 2 units of PRBC yesterday; Hgb 7.7; transfusing another 2 units prior to EGD today; Patient has no other complaints   : S/P EGD yesterday now going for colonoscopy on Monday; H/H stable after total of 4 units PRBC. patient has not complaints    Allergies:  No Known Allergies    REVIEW OF SYSTEMS:  See HPI. All other review of systems is negative unless indicated above.     OBJECTIVE  Physical Exam:  Vital Signs Last 24 Hrs  T(C): 36.3 (2020 08:30), Max: 36.6 (10 Jul 2020 18:08)  T(F): 97.3 (2020 08:30), Max: 97.9 (10 Jul 2020 18:08)  HR: 89 (2020 08:30) (74 - 89)  BP: 109/60 (2020 08:30) (104/54 - 124/64)  BP(mean): --  RR: 18 (2020 08:30) (16 - 18)  SpO2: 100% (2020 08:30) (97% - 100%)    Constitutional: NAD, awake and alert, well-developed  Neurological: AAO x 3, no focal deficits  HEENT: PERRLA, EOMI, MMM  Neck: Soft and supple, No LAD, No JVD  Respiratory: Breath sounds are clear bilaterally, No wheezing, rales or rhonchi  Cardiovascular: S1 and S2, regular rate and rhythm; no Murmurs, gallops or rubs  Gastrointestinal: Bowel Sounds present, soft, nontender, nondistended, no guarding, no rebound tenderness  Back: No CVA tenderness   Extremities: No peripheral edema  Vascular: 2+ peripheral pulses  Musculoskeletal: 5/5 strength b/l upper and lower extremities  Skin: No rashes  Breast: Deferred  Rectal: Deferred    MEDICATIONS  (STANDING):  atorvastatin 20 milliGRAM(s) Oral at bedtime  dextrose 5%. 1000 milliLiter(s) (50 mL/Hr) IV Continuous <Continuous>  dextrose 50% Injectable 12.5 Gram(s) IV Push once  dextrose 50% Injectable 25 Gram(s) IV Push once  dextrose 50% Injectable 25 Gram(s) IV Push once  insulin lispro (HumaLOG) corrective regimen sliding scale   SubCutaneous every 6 hours  multivitamin 1 Tablet(s) Oral daily  pantoprazole Infusion 8 mG/Hr (10 mL/Hr) IV Continuous <Continuous>  sodium chloride 0.9%. 1000 milliLiter(s) (100 mL/Hr) IV Continuous <Continuous>    Lab Results:  CBC  CBC Full  -  ( 2020 08:11 )  WBC Count : 5.11 K/uL  RBC Count : 3.39 M/uL  Hemoglobin : 10.4 g/dL  Hematocrit : 30.5 %  Platelet Count - Automated : 170 K/uL  Mean Cell Volume : 90.0 fl  Mean Cell Hemoglobin : 30.7 pg  Mean Cell Hemoglobin Concentration : 34.1 gm/dL  Auto Neutrophil # : x  Auto Lymphocyte # : x  Auto Monocyte # : x  Auto Eosinophil # : x  Auto Basophil # : x  Auto Neutrophil % : x  Auto Lymphocyte % : x  Auto Monocyte % : x  Auto Eosinophil % : x  Auto Basophil % : x    .		Differential:	[] Automated		[] Manual  Chemistry                        10.4   5.11  )-----------( 170      ( 2020 08:11 )             30.5     07-11    140  |  109<H>  |  17  ----------------------------<  130<H>  4.2   |  22  |  0.72    Ca    7.7<L>      2020 08:11  Phos  3.5     07-11  Mg     1.9     07-11      Urinalysis Basic - ( 2020 14:30 )    Color: Yellow / Appearance: Clear / S.005 / pH: x  Gluc: x / Ketone: Moderate  / Bili: Negative / Urobili: Negative mg/dL   Blood: x / Protein: Negative mg/dL / Nitrite: Negative   Leuk Esterase: Negative / RBC: 0-2 /HPF / WBC Negative   Sq Epi: x / Non Sq Epi: Occasional / Bacteria: x    MICROBIOLOGY/CULTURES:  Culture Results:   <10,000 CFU/mL Normal Urogenital Francisca ( @ 14:30)      PTT - ( 2020 11:02 )  PTT:27.2 sec  CARDIAC MARKERS ( 2020 17:18 )  0.019 ng/mL / x     / x     / x     / x      CARDIAC MARKERS ( 2020 14:38 )  <0.015 ng/mL / x     / x     / x     / x      CARDIAC MARKERS ( 2020 11:02 )  <0.015 ng/mL / x     / x     / x     / x        RADIOLOGY/EKG:    < from: Xray Chest 1 View- PORTABLE-Urgent (20 @ 11:13) >    FINDINGS/  IMPRESSION:          The lungs are clear.  No pleural abnormality is seen.      Cardiomediastinal silhouette is intact.    < end of copied text >    < from: CT Abdomen and Pelvis w/ IV Cont (20 @ 12:13) >    IMPRESSION:   1. Gastric wall thickening, question gastritis.  2. Hepatic steatosis.  3. No bowel obstruction.  4. Colonic diverticulosis without CT evidence of acute diverticulitis.      < end of copied text >    < from: CT Head No Cont (20 @ 12:08) >    IMPRESSION:  No acute intracranial hemorrhage or acute territorial infarct.  If symptoms persist, follow-up MRI exam recommended.    < end of copied text >    < from: Upper Endoscopy (07.10.20 @ 16:44) >    IMPRESS                      - Small hiatal hernia.    IMPRESS                      - Non-bleeding gastric fundic ulcer with pigmented     IMPRESS                      material with underlying fundic submucosal prominence     IMPRESS                      ?fold vs. gist. Treated with argon plasma coagulation     IMPRESS                      (APC).    IMPRESS                      - Normal antrum. Biopsied.    IMPRESS                      - Granular mucosa in the second portion of the duodenum.     IMPRESS                      Biopsied.    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     IMPRESS                      Treated with bipolar caute    < end of copied text >
HOSPITALIST PROGRESS NOTE:  SUBJECTIVE:  PCP:  Chief Complaint: Patient is a 79y old  Male who presents with a chief complaint of weakness. Vomiting. Melena (10 Jul 2020 16:09)      HPI:  80yo/M with PMH diabetes, hyperlipidemia presented with weakness and melena. Patient states he had one episode of melena on July 4th, then he felt better until earlier today when he had another episode of melena. +abd discomfort, +vomiting. Denies taking NSAID's. Last EGD/colonoscopy was about 10-12 years ago with DR Bob. Not on any AC. IN ED started on Protonix drip and received already 1 Unit PRBC. Due for 2nd unit. Feels comfortable now (09 Jul 2020 16:23)    7/10: Above reviewed; after 2 units of PRBC yesterday; Hgb 7.7; transfusing another 2 units prior to EGD today; Patient has no other complaints   7/11: S/P EGD yesterday now going for colonoscopy on Monday; H/H stable after total of 4 units PRBC. patient has not complaints  7/12:  Patient has no complaints; on clears and going for colonoscopy angelia; No evidence of bleeding     Allergies:  No Known Allergies    REVIEW OF SYSTEMS:  See HPI. All other review of systems is negative unless indicated above.     OBJECTIVE  Physical Exam:  Vital Signs Last 24 Hrs  T(C): 36.7 (12 Jul 2020 08:20), Max: 36.7 (11 Jul 2020 23:19)  T(F): 98 (12 Jul 2020 08:20), Max: 98 (11 Jul 2020 23:19)  HR: 71 (12 Jul 2020 08:20) (70 - 71)  BP: 113/58 (12 Jul 2020 08:20) (97/61 - 113/58)  BP(mean): --  RR: 17 (12 Jul 2020 08:20) (16 - 17)  SpO2: 100% (12 Jul 2020 08:20) (98% - 100%)    Constitutional: NAD, awake and alert, well-developed  Neurological: AAO x 3, no focal deficits  HEENT: PERRLA, EOMI, MMM  Neck: Soft and supple, No LAD, No JVD  Respiratory: Breath sounds are clear bilaterally, No wheezing, rales or rhonchi  Cardiovascular: S1 and S2, regular rate and rhythm; no Murmurs, gallops or rubs  Gastrointestinal: Bowel Sounds present, soft, nontender, nondistended, no guarding, no rebound tenderness  Back: No CVA tenderness   Extremities: No peripheral edema  Vascular: 2+ peripheral pulses  Musculoskeletal: 5/5 strength b/l upper and lower extremities  Skin: No rashes  Breast: Deferred  Rectal: Deferred    MEDICATIONS  (STANDING):  atorvastatin 20 milliGRAM(s) Oral at bedtime  dextrose 5%. 1000 milliLiter(s) (50 mL/Hr) IV Continuous <Continuous>  dextrose 50% Injectable 12.5 Gram(s) IV Push once  dextrose 50% Injectable 25 Gram(s) IV Push once  dextrose 50% Injectable 25 Gram(s) IV Push once  insulin lispro (HumaLOG) corrective regimen sliding scale   SubCutaneous every 6 hours  multivitamin 1 Tablet(s) Oral daily  pantoprazole Infusion 8 mG/Hr (10 mL/Hr) IV Continuous <Continuous>  sodium chloride 0.9%. 1000 milliLiter(s) (100 mL/Hr) IV Continuous <Continuous>    Lab Results:  CBC  CBC Full  -  ( 12 Jul 2020 08:52 )  WBC Count : x  RBC Count : x  Hemoglobin : 11.8 g/dL  Hematocrit : 34.8 %  Platelet Count - Automated : x  Mean Cell Volume : x  Mean Cell Hemoglobin : x  Mean Cell Hemoglobin Concentration : x  Auto Neutrophil # : x  Auto Lymphocyte # : x  Auto Monocyte # : x  Auto Eosinophil # : x  Auto Basophil # : x  Auto Neutrophil % : x  Auto Lymphocyte % : x  Auto Monocyte % : x  Auto Eosinophil % : x  Auto Basophil % : x    .		Differential:	[] Automated		[] Manual  Chemistry                        11.8   x     )-----------( x        ( 12 Jul 2020 08:52 )             34.8     07-11    140  |  109<H>  |  17  ----------------------------<  130<H>  4.2   |  22  |  0.72    Ca    7.7<L>      11 Jul 2020 08:11  Phos  3.5     07-11  Mg     1.9     07-11      MICROBIOLOGY/CULTURES:  Culture Results:   <10,000 CFU/mL Normal Urogenital Francisca (07-09 @ 14:30)    CARDIAC MARKERS ( 09 Jul 2020 17:18 )  0.019 ng/mL / x     / x     / x     / x      CARDIAC MARKERS ( 09 Jul 2020 14:38 )  <0.015 ng/mL / x     / x     / x     / x      CARDIAC MARKERS ( 09 Jul 2020 11:02 )  <0.015 ng/mL / x     / x     / x     / x        RADIOLOGY/EKG:    < from: Xray Chest 1 View- PORTABLE-Urgent (07.09.20 @ 11:13) >    FINDINGS/  IMPRESSION:          The lungs are clear.  No pleural abnormality is seen.      Cardiomediastinal silhouette is intact.    < end of copied text >    < from: CT Abdomen and Pelvis w/ IV Cont (07.09.20 @ 12:13) >    IMPRESSION:   1. Gastric wall thickening, question gastritis.  2. Hepatic steatosis.  3. No bowel obstruction.  4. Colonic diverticulosis without CT evidence of acute diverticulitis.      < end of copied text >    < from: CT Head No Cont (07.09.20 @ 12:08) >    IMPRESSION:  No acute intracranial hemorrhage or acute territorial infarct.  If symptoms persist, follow-up MRI exam recommended.    < end of copied text >    < from: Upper Endoscopy (07.10.20 @ 16:44) >    IMPRESS                      - Small hiatal hernia.    IMPRESS                      - Non-bleeding gastric fundic ulcer with pigmented     IMPRESS                      material with underlying fundic submucosal prominence     IMPRESS                      ?fold vs. gist. Treated with argon plasma coagulation     IMPRESS                      (APC).    IMPRESS                      - Normal antrum. Biopsied.    IMPRESS                      - Granular mucosa in the second portion of the duodenum.     IMPRESS                      Biopsied.    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     IMPRESS                      Treated with bipolar caute    < end of copied text >
HOSPITALIST PROGRESS NOTE:  SUBJECTIVE:  PCP:  Chief Complaint: Patient is a 79y old  Male who presents with a chief complaint of weakness. Vomiting. Melena (10 Jul 2020 16:09)      HPI:  80yo/M with PMH diabetes, hyperlipidemia presented with weakness and melena. Patient states he had one episode of melena on July 4th, then he felt better until earlier today when he had another episode of melena. +abd discomfort, +vomiting. Denies taking NSAID's. Last EGD/colonoscopy was about 10-12 years ago with DR Bob. Not on any AC. IN ED started on Protonix drip and received already 1 Unit PRBC. Due for 2nd unit. Feels comfortable now (09 Jul 2020 16:23)    7/10: Above reviewed; after 2 units of PRBC yesterday; Hgb 7.7; transfusing another 2 units prior to EGD today; Patient has no other complaints   7/11: S/P EGD yesterday now going for colonoscopy on Monday; H/H stable after total of 4 units PRBC. patient has not complaints  7/12:  Patient has no complaints; on clears and going for colonoscopy angelia; No evidence of bleeding   7/13:  Patient has no complaints; going for Colonoscopy later today; No signs of bleeding, cp, dyspnea or abdominal pain; H/h Stable     Allergies:  No Known Allergies    REVIEW OF SYSTEMS:  See HPI. All other review of systems is negative unless indicated above.     OBJECTIVE  Physical Exam:  Vital Signs Last 24 Hrs  T(C): 36.7 (13 Jul 2020 07:32), Max: 36.8 (12 Jul 2020 23:36)  T(F): 98.1 (13 Jul 2020 07:32), Max: 98.3 (12 Jul 2020 23:36)  HR: 79 (13 Jul 2020 07:32) (70 - 79)  BP: 121/57 (13 Jul 2020 07:32) (113/59 - 123/50)  BP(mean): --  RR: 18 (13 Jul 2020 07:32) (17 - 18)  SpO2: 100% (13 Jul 2020 07:32) (99% - 100%)    Constitutional: NAD, awake and alert, well-developed  Neurological: AAO x 3, no focal deficits  HEENT: PERRLA, EOMI, MMM  Neck: Soft and supple, No LAD, No JVD  Respiratory: Breath sounds are clear bilaterally, No wheezing, rales or rhonchi  Cardiovascular: S1 and S2, regular rate and rhythm; no Murmurs, gallops or rubs  Gastrointestinal: Bowel Sounds present, soft, nontender, nondistended, no guarding, no rebound tenderness  Back: No CVA tenderness   Extremities: No peripheral edema  Vascular: 2+ peripheral pulses  Musculoskeletal: 5/5 strength b/l upper and lower extremities  Skin: No rashes  Breast: Deferred  Rectal: Deferred    MEDICATIONS  (STANDING):  atorvastatin 20 milliGRAM(s) Oral at bedtime  dextrose 5%. 1000 milliLiter(s) (50 mL/Hr) IV Continuous <Continuous>  dextrose 50% Injectable 12.5 Gram(s) IV Push once  dextrose 50% Injectable 25 Gram(s) IV Push once  dextrose 50% Injectable 25 Gram(s) IV Push once  insulin lispro (HumaLOG) corrective regimen sliding scale   SubCutaneous every 6 hours  multivitamin 1 Tablet(s) Oral daily  pantoprazole Infusion 8 mG/Hr (10 mL/Hr) IV Continuous <Continuous>  sodium chloride 0.9%. 1000 milliLiter(s) (100 mL/Hr) IV Continuous <Continuous>    Lab Results:  CBC  CBC Full  -  ( 13 Jul 2020 08:20 )  WBC Count : 4.75 K/uL  RBC Count : 3.52 M/uL  Hemoglobin : 10.9 g/dL  Hematocrit : 32.1 %  Platelet Count - Automated : 214 K/uL  Mean Cell Volume : 91.2 fl  Mean Cell Hemoglobin : 31.0 pg  Mean Cell Hemoglobin Concentration : 34.0 gm/dL  Auto Neutrophil # : x  Auto Lymphocyte # : x  Auto Monocyte # : x  Auto Eosinophil # : x  Auto Basophil # : x  Auto Neutrophil % : x  Auto Lymphocyte % : x  Auto Monocyte % : x  Auto Eosinophil % : x  Auto Basophil % : x    .		Differential:	[] Automated		[] Manual  Chemistry                        10.9   4.75  )-----------( 214      ( 13 Jul 2020 08:20 )             32.1     07-13    141  |  107  |  8   ----------------------------<  146<H>  3.2<L>   |  28  |  0.68    Ca    8.3<L>      13 Jul 2020 08:20  Phos  3.1     07-13  Mg     1.8     07-13    MICROBIOLOGY/CULTURES:  Culture Results:   <10,000 CFU/mL Normal Urogenital Francisca (07-09 @ 14:30)      CARDIAC MARKERS ( 09 Jul 2020 17:18 )  0.019 ng/mL / x     / x     / x     / x      CARDIAC MARKERS ( 09 Jul 2020 14:38 )  <0.015 ng/mL / x     / x     / x     / x      CARDIAC MARKERS ( 09 Jul 2020 11:02 )  <0.015 ng/mL / x     / x     / x     / x        RADIOLOGY/EKG:    < from: Xray Chest 1 View- PORTABLE-Urgent (07.09.20 @ 11:13) >    FINDINGS/  IMPRESSION:          The lungs are clear.  No pleural abnormality is seen.      Cardiomediastinal silhouette is intact.    < end of copied text >    < from: CT Abdomen and Pelvis w/ IV Cont (07.09.20 @ 12:13) >    IMPRESSION:   1. Gastric wall thickening, question gastritis.  2. Hepatic steatosis.  3. No bowel obstruction.  4. Colonic diverticulosis without CT evidence of acute diverticulitis.      < end of copied text >    < from: CT Head No Cont (07.09.20 @ 12:08) >    IMPRESSION:  No acute intracranial hemorrhage or acute territorial infarct.  If symptoms persist, follow-up MRI exam recommended.    < end of copied text >    < from: Upper Endoscopy (07.10.20 @ 16:44) >    IMPRESS                      - Small hiatal hernia.    IMPRESS                      - Non-bleeding gastric fundic ulcer with pigmented     IMPRESS                      material with underlying fundic submucosal prominence     IMPRESS                      ?fold vs. gist. Treated with argon plasma coagulation     IMPRESS                      (APC).    IMPRESS                      - Normal antrum. Biopsied.    IMPRESS                      - Granular mucosa in the second portion of the duodenum.     IMPRESS                      Biopsied.    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     IMPRESS                      Treated with bipolar caute    < end of copied text >
HOSPITALIST PROGRESS NOTE:  SUBJECTIVE:  PCP:  Chief Complaint: Patient is a 79y old  Male who presents with a chief complaint of weakness. Vomiting. Melena (10 Jul 2020 16:09)      HPI:  80yo/M with PMH diabetes, hyperlipidemia presented with weakness and melena. Patient states he had one episode of melena on , then he felt better until earlier today when he had another episode of melena. +abd discomfort, +vomiting. Denies taking NSAID's. Last EGD/colonoscopy was about 10-12 years ago with DR Bob. Not on any AC. IN ED started on Protonix drip and received already 1 Unit PRBC. Due for 2nd unit. Feels comfortable now (2020 16:23)    7/10: Above reviewed; after 2 units of PRBC yesterday; Hgb 7.7; transfusing another 2 units prior to EGD today; Patient has no other complaints     Allergies:  No Known Allergies    REVIEW OF SYSTEMS:  See HPI. All other review of systems is negative unless indicated above.     OBJECTIVE  Physical Exam:  Vital Signs:    Vital Signs Last 24 Hrs  T(C): 36.3 (10 Jul 2020 13:53), Max: 37 (2020 17:50)  T(F): 97.3 (10 Jul 2020 13:53), Max: 98.6 (2020 17:50)  HR: 83 (10 Jul 2020 13:53) (74 - 88)  BP: 124/64 (10 Jul 2020 13:53) (102/49 - 137/67)  BP(mean): 67 (2020 17:50) (61 - 67)  RR: 18 (10 Jul 2020 13:53) (16 - 20)  SpO2: 100% (10 Jul 2020 13:53) (97% - 100%)  I&O's Summary    2020 07:01  -  10 Jul 2020 07:00  --------------------------------------------------------  IN: 986 mL / OUT: 0 mL / NET: 986 mL    Constitutional: NAD, awake and alert, well-developed  Neurological: AAO x 3, no focal deficits  HEENT: PERRLA, EOMI, MMM  Neck: Soft and supple, No LAD, No JVD  Respiratory: Breath sounds are clear bilaterally, No wheezing, rales or rhonchi  Cardiovascular: S1 and S2, regular rate and rhythm; no Murmurs, gallops or rubs  Gastrointestinal: Bowel Sounds present, soft, nontender, nondistended, no guarding, no rebound tenderness  Back: No CVA tenderness   Extremities: No peripheral edema  Vascular: 2+ peripheral pulses  Musculoskeletal: 5/5 strength b/l upper and lower extremities  Skin: No rashes  Breast: Deferred  Rectal: Deferred    MEDICATIONS  (STANDING):  atorvastatin 20 milliGRAM(s) Oral at bedtime  dextrose 5%. 1000 milliLiter(s) (50 mL/Hr) IV Continuous <Continuous>  dextrose 50% Injectable 12.5 Gram(s) IV Push once  dextrose 50% Injectable 25 Gram(s) IV Push once  dextrose 50% Injectable 25 Gram(s) IV Push once  insulin lispro (HumaLOG) corrective regimen sliding scale   SubCutaneous every 6 hours  multivitamin 1 Tablet(s) Oral daily  pantoprazole Infusion 8 mG/Hr (10 mL/Hr) IV Continuous <Continuous>  sodium chloride 0.9%. 1000 milliLiter(s) (100 mL/Hr) IV Continuous <Continuous>      LABS: All Labs Reviewed:                        7.7    5.18  )-----------( 141      ( 10 Jul 2020 10:17 )             23.0     07-10    142  |  111<H>  |  24<H>  ----------------------------<  78  4.0   |  24  |  0.61    Ca    7.8<L>      10 Jul 2020 10:17  Mg     1.9     07-09    TPro  6.2  /  Alb  3.1<L>  /  TBili  0.2  /  DBili  x   /  AST  8<L>  /  ALT  20  /  AlkPhos  37<L>  07-09    PT/INR - ( 2020 11:02 )   PT: 12.6 sec;   INR: 1.08 ratio         PTT - ( 2020 11:02 )  PTT:27.2 sec  CARDIAC MARKERS ( 2020 17:18 )  0.019 ng/mL / x     / x     / x     / x      CARDIAC MARKERS ( 2020 14:38 )  <0.015 ng/mL / x     / x     / x     / x      CARDIAC MARKERS ( 2020 11:02 )  <0.015 ng/mL / x     / x     / x     / x        Urinalysis Basic - ( 2020 14:30 )    Color: Yellow / Appearance: Clear / S.005 / pH: x  Gluc: x / Ketone: Moderate  / Bili: Negative / Urobili: Negative mg/dL   Blood: x / Protein: Negative mg/dL / Nitrite: Negative   Leuk Esterase: Negative / RBC: 0-2 /HPF / WBC Negative   Sq Epi: x / Non Sq Epi: Occasional / Bacteria: x    RADIOLOGY/EKG:    < from: Xray Chest 1 View- PORTABLE-Urgent (20 @ 11:13) >    FINDINGS/  IMPRESSION:          The lungs are clear.  No pleural abnormality is seen.      Cardiomediastinal silhouette is intact.    < end of copied text >    < from: CT Abdomen and Pelvis w/ IV Cont (20 @ 12:13) >    IMPRESSION:   1. Gastric wall thickening, question gastritis.  2. Hepatic steatosis.  3. No bowel obstruction.  4. Colonic diverticulosis without CT evidence of acute diverticulitis.      < end of copied text >    < from: CT Head No Cont (20 @ 12:08) >    IMPRESSION:  No acute intracranial hemorrhage or acute territorial infarct.  If symptoms persist, follow-up MRI exam recommended.    < end of copied text >
HOSPITALIST PROGRESS NOTE:  SUBJECTIVE:  PCP:  Chief Complaint: Patient is a 79y old  Male who presents with a chief complaint of weakness. Vomiting. Melena (10 Jul 2020 16:09)      HPI:  80yo/M with PMH diabetes, hyperlipidemia presented with weakness and melena. Patient states he had one episode of melena on July 4th, then he felt better until earlier today when he had another episode of melena. +abd discomfort, +vomiting. Denies taking NSAID's. Last EGD/colonoscopy was about 10-12 years ago with DR Bob. Not on any AC. IN ED started on Protonix drip and received already 1 Unit PRBC. Due for 2nd unit. Feels comfortable now (09 Jul 2020 16:23)    7/10: Above reviewed; after 2 units of PRBC yesterday; Hgb 7.7; transfusing another 2 units prior to EGD today; Patient has no other complaints   7/11: S/P EGD yesterday now going for colonoscopy on Monday; H/H stable after total of 4 units PRBC. patient has not complaints  7/12:  Patient has no complaints; on clears and going for colonoscopy angelia; No evidence of bleeding   7/13:  Patient has no complaints; going for Colonoscopy later today; No signs of bleeding, cp, dyspnea or abdominal pain; H/h Stable   7/14:  Patient has no complaints; tolerating diet;     Allergies:  No Known Allergies    REVIEW OF SYSTEMS:  See HPI. All other review of systems is negative unless indicated above.     OBJECTIVE  Physical Exam:  Vital Signs Last 24 Hrs  T(C): 36.9 (14 Jul 2020 07:25), Max: 36.9 (14 Jul 2020 07:25)  T(F): 98.5 (14 Jul 2020 07:25), Max: 98.5 (14 Jul 2020 07:25)  HR: 90 (14 Jul 2020 07:25) (69 - 90)  BP: 107/67 (14 Jul 2020 07:25) (107/67 - 139/72)  BP(mean): --  RR: 17 (14 Jul 2020 07:25) (17 - 17)  SpO2: 100% (14 Jul 2020 07:25) (97% - 100%)    Constitutional: NAD, awake and alert, well-developed  Neurological: AAO x 3, no focal deficits  HEENT: PERRLA, EOMI, MMM  Neck: Soft and supple, No LAD, No JVD  Respiratory: Breath sounds are clear bilaterally, No wheezing, rales or rhonchi  Cardiovascular: S1 and S2, regular rate and rhythm; no Murmurs, gallops or rubs  Gastrointestinal: Bowel Sounds present, soft, nontender, nondistended, no guarding, no rebound tenderness  Back: No CVA tenderness   Extremities: No peripheral edema  Vascular: 2+ peripheral pulses  Musculoskeletal: 5/5 strength b/l upper and lower extremities  Skin: No rashes  Breast: Deferred  Rectal: Deferred    MEDICATIONS  (STANDING):  atorvastatin 20 milliGRAM(s) Oral at bedtime  dextrose 5%. 1000 milliLiter(s) (50 mL/Hr) IV Continuous <Continuous>  dextrose 50% Injectable 12.5 Gram(s) IV Push once  dextrose 50% Injectable 25 Gram(s) IV Push once  dextrose 50% Injectable 25 Gram(s) IV Push once  insulin lispro (HumaLOG) corrective regimen sliding scale   SubCutaneous every 6 hours  multivitamin 1 Tablet(s) Oral daily  pantoprazole Infusion 8 mG/Hr (10 mL/Hr) IV Continuous <Continuous>  sodium chloride 0.9%. 1000 milliLiter(s) (100 mL/Hr) IV Continuous <Continuous>    Lab Results:  CBC  CBC Full  -  ( 14 Jul 2020 08:50 )  WBC Count : 7.43 K/uL  RBC Count : 3.68 M/uL  Hemoglobin : 11.6 g/dL  Hematocrit : 33.9 %  Platelet Count - Automated : 223 K/uL  Mean Cell Volume : 92.1 fl  Mean Cell Hemoglobin : 31.5 pg  Mean Cell Hemoglobin Concentration : 34.2 gm/dL  Auto Neutrophil # : x  Auto Lymphocyte # : x  Auto Monocyte # : x  Auto Eosinophil # : x  Auto Basophil # : x  Auto Neutrophil % : x  Auto Lymphocyte % : x  Auto Monocyte % : x  Auto Eosinophil % : x  Auto Basophil % : x    .		Differential:	[] Automated		[] Manual  Chemistry                        11.6   7.43  )-----------( 223      ( 14 Jul 2020 08:50 )             33.9     07-14    141  |  108  |  8   ----------------------------<  139<H>  4.0   |  29  |  0.69    Ca    8.8      14 Jul 2020 08:50  Phos  3.2     07-14  Mg     1.9     07-14      MICROBIOLOGY/CULTURES:  Culture Results:   <10,000 CFU/mL Normal Urogenital Francisca (07-09 @ 14:30)      CARDIAC MARKERS ( 09 Jul 2020 17:18 )  0.019 ng/mL / x     / x     / x     / x      CARDIAC MARKERS ( 09 Jul 2020 14:38 )  <0.015 ng/mL / x     / x     / x     / x      CARDIAC MARKERS ( 09 Jul 2020 11:02 )  <0.015 ng/mL / x     / x     / x     / x        RADIOLOGY/EKG:    < from: Xray Chest 1 View- PORTABLE-Urgent (07.09.20 @ 11:13) >    FINDINGS/  IMPRESSION:          The lungs are clear.  No pleural abnormality is seen.      Cardiomediastinal silhouette is intact.    < end of copied text >    < from: CT Abdomen and Pelvis w/ IV Cont (07.09.20 @ 12:13) >    IMPRESSION:   1. Gastric wall thickening, question gastritis.  2. Hepatic steatosis.  3. No bowel obstruction.  4. Colonic diverticulosis without CT evidence of acute diverticulitis.      < end of copied text >    < from: CT Head No Cont (07.09.20 @ 12:08) >    IMPRESSION:  No acute intracranial hemorrhage or acute territorial infarct.  If symptoms persist, follow-up MRI exam recommended.    < end of copied text >    < from: Upper Endoscopy (07.10.20 @ 16:44) >    IMPRESS                      - Small hiatal hernia.    IMPRESS                      - Non-bleeding gastric fundic ulcer with pigmented     IMPRESS                      material with underlying fundic submucosal prominence     IMPRESS                      ?fold vs. gist. Treated with argon plasma coagulation     IMPRESS                      (APC).    IMPRESS                      - Normal antrum. Biopsied.    IMPRESS                      - Granular mucosa in the second portion of the duodenum.     IMPRESS                      Biopsied.    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     IMPRESS                      Treated with bipolar caute    < end of copied text >    < from: Colonoscopy (07.13.20 @ 13:13) >    IMPRESS Impression:          - Diverticulosis in the sigmoid colon.    IMPRESS                      - One 15 mm polyp in the sigmoid colon, removed with a     IMPRESS                      hot snare. Resected and retrieved.    IMPRESS                      - One 15 mm polyp in the sigmoid colon, removed with a     IMPRESS                      hot snare. Resected and retrieved.    IMPRESS                      - One 2 mm polyp at the splenic flexure, removed with a     IMPRESS               jumbo cold forceps. Resected and retrieved.    IMPRESS                      - One 10 mm polyp in the transverse colon, removed with     IMPRESS                      a hot snare. Resected and retrieved. Clips were placed.    ENDORECOMMENDATION Recommendation:      - Patient has a contact number available for     ENDORECOMMENDATION                      emergencies. The signs and symptoms of potential delayed     ENDORECOMMENDATION                      complications were discussed with the patient. Return to     ENDORECOMMENDATION                      normal activities tomorrow. Written discharge     ENDORECOMMENDATION                      instructions were provided to the patient.    ENDORECOMMENDATION                      - Resume previous diet.    ENDORECOMMENDATION                      - Continue present medications.    ENDORECOMMENDATION                      - Await pathology results.    ENDORECOMMENDATION                      - Repeat colonoscopy is recommended. The colonoscopy     ENDORECOMMENDATION                      date will be determined after pathology results from     ENDORECOMMENDATION                      today's exam become available for review.    < end of copied text >
no abd pain  no further bleeding or melena or n/v      PAST MEDICAL & SURGICAL HISTORY:  DM (diabetes mellitus)  S/P colonoscopic polypectomy      Allergies    No Known Allergies    Intolerances        MEDICATIONS  (STANDING):  atorvastatin 20 milliGRAM(s) Oral at bedtime  dextrose 5%. 1000 milliLiter(s) (50 mL/Hr) IV Continuous <Continuous>  dextrose 50% Injectable 12.5 Gram(s) IV Push once  dextrose 50% Injectable 25 Gram(s) IV Push once  dextrose 50% Injectable 25 Gram(s) IV Push once  insulin lispro (HumaLOG) corrective regimen sliding scale   SubCutaneous every 6 hours  multivitamin 1 Tablet(s) Oral daily  pantoprazole Infusion 8 mG/Hr (10 mL/Hr) IV Continuous <Continuous>  sodium chloride 0.9%. 1000 milliLiter(s) (100 mL/Hr) IV Continuous <Continuous>    MEDICATIONS  (PRN):  acetaminophen   Tablet .. 650 milliGRAM(s) Oral every 4 hours PRN Mild Pain (1 - 3)  aluminum hydroxide/magnesium hydroxide/simethicone Suspension 30 milliLiter(s) Oral every 4 hours PRN Dyspepsia  dextrose 40% Gel 15 Gram(s) Oral once PRN Blood Glucose LESS THAN 70 milliGRAM(s)/deciliter  glucagon  Injectable 1 milliGRAM(s) IntraMuscular once PRN Glucose LESS THAN 70 milligrams/deciliter  ondansetron Injectable 4 milliGRAM(s) IV Push every 6 hours PRN Nausea      FAMILY HISTORY:  FH: diabetes mellitus      Social History:    REVIEW OF SYSTEMS      General:	No fever or chills    Skin/Breast: No jaundice or rash   		  ENMT: denies sore throat or thrush    Respiratory and Thorax: Denies dyspnea or cough or shortness of breath  	  Cardiovascular: Denies chest pain or palpitations 	    Gastrointestinal: Denies jaundice or pruritis    Genitourinary: Denies dysuria or hematuria	    Musculoskeletal: Denies muscular pain or swelling	    Neurological: Denies confusion or tremor	    Hematology/Lymphatics: Denies easy bruising or bleeding 	    Endocrine:	Denies polyphagia or polyuria    See above hx otherwise neg for any major organ systems    PHYSICAL EXAM:    Vital Signs Last 24 Hrs  T(C): 36.3 (11 Jul 2020 08:30), Max: 36.6 (10 Jul 2020 18:08)  T(F): 97.3 (11 Jul 2020 08:30), Max: 97.9 (10 Jul 2020 18:08)  HR: 89 (11 Jul 2020 08:30) (74 - 89)  BP: 109/60 (11 Jul 2020 08:30) (104/54 - 124/64)  BP(mean): --  RR: 18 (11 Jul 2020 08:30) (16 - 18)  SpO2: 100% (11 Jul 2020 08:30) (97% - 100%)    Constitutional: no acute distress    ENMT: NC/AT scl anicteric opm pink no lesions     Neck: supple. No jvd or LN    Respiratory: Clear     Cardiovascular: RRR s1s2     Gastrointestinal: Pos bs , soft , not tender, no hepatosplenomegaly,  no mass      Back: No CVA tenderness    Extremities: NO cce     Neurological: Alert and oriented x 3     Skin: No rash or jaundice     Date/Time:07-11 @ 08:11    Albumin: --  ALT/SGPT: --  Alk Phos: --  AST/SGOT: --  Bilirubin Direct: --  Bilirubin Total: --  Ca: 7.7  eGFR : 101  eGFR Non-: 87  Lipase: --  Amylase: --  INR: --  PTT: --      07-11    140  |  109<H>  |  17  ----------------------------<  130<H>  4.2   |  22  |  0.72    Ca    7.7<L>      11 Jul 2020 08:11  Phos  3.5     07-11  Mg     1.9     07-11                              10.4   5.11  )-----------( 170      ( 11 Jul 2020 08:11 )             30.5

## 2020-07-14 NOTE — DISCHARGE NOTE PROVIDER - NSDCMRMEDTOKEN_GEN_ALL_CORE_FT
glipiZIDE 10 mg oral tablet: 1 tab(s) orally 2 times a day  metFORMIN 1000 mg oral tablet: 1 tab(s) orally 2 times a day  Multiple Vitamins oral tablet: 1 tab(s) orally once a day  Protonix 40 mg oral delayed release tablet: 1 tab(s) orally every 12 hours   rosuvastatin 5 mg oral tablet: 1 tab(s) orally once a day

## 2020-07-14 NOTE — DISCHARGE NOTE PROVIDER - CARE PROVIDERS DIRECT ADDRESSES
[email or web address]

## 2020-11-02 NOTE — ED ADULT NURSE NOTE - ED STAT RN HANDOFF DETAILS
Per [place] [employer], Ozepmic  Is not a covered medication. Please see the attached list on preferred medications.    Patient must have treatment failure of the following:    Bydureon/Byetta    And    Gurmeet/Victoza    Please let us know how you will proceed.       report given to 2sw rn

## 2024-01-29 NOTE — ED ADULT NURSE NOTE - NSFALLRSKOUTCOME_ED_ALL_ED
[Time Spent: ___ minutes] : I have spent [unfilled] minutes of time on the encounter. Universal Safety Interventions